# Patient Record
Sex: MALE | URBAN - METROPOLITAN AREA
[De-identification: names, ages, dates, MRNs, and addresses within clinical notes are randomized per-mention and may not be internally consistent; named-entity substitution may affect disease eponyms.]

---

## 2022-07-05 ENCOUNTER — OFFICE (OUTPATIENT)
Dept: URBAN - METROPOLITAN AREA CLINIC 30 | Facility: CLINIC | Age: 42
Setting detail: OPHTHALMOLOGY
End: 2022-07-05
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
## 2022-07-05 PROCEDURE — NO SHOW FE NO SHOW FEE: Performed by: OPHTHALMOLOGY

## 2023-08-21 ENCOUNTER — APPOINTMENT (OUTPATIENT)
Dept: PEDIATRIC ORTHOPEDIC SURGERY | Facility: CLINIC | Age: 43
End: 2023-08-21
Payer: MEDICAID

## 2023-08-21 VITALS — BODY MASS INDEX: 32.18 KG/M2 | HEIGHT: 67 IN | WEIGHT: 205 LBS

## 2023-08-21 VITALS — TEMPERATURE: 97 F | SYSTOLIC BLOOD PRESSURE: 131 MMHG | DIASTOLIC BLOOD PRESSURE: 84 MMHG

## 2023-08-21 DIAGNOSIS — S50.11XA CONTUSION OF RIGHT FOREARM, INITIAL ENCOUNTER: ICD-10-CM

## 2023-08-21 PROBLEM — Z00.00 ENCOUNTER FOR PREVENTIVE HEALTH EXAMINATION: Status: ACTIVE | Noted: 2023-08-21

## 2023-08-21 PROCEDURE — 99202 OFFICE O/P NEW SF 15 MIN: CPT

## 2023-08-21 RX ORDER — PREDNISONE 10 MG
TABLET ORAL
Refills: 0 | Status: ACTIVE | COMMUNITY

## 2023-08-21 RX ORDER — PREDNISONE 10 MG/1
10 TABLET ORAL AS DIRECTED
Qty: 20 | Refills: 0 | Status: ACTIVE | COMMUNITY
Start: 2023-08-21 | End: 1900-01-01

## 2023-08-21 NOTE — PHYSICAL EXAM
[de-identified] : Examination today reveals a full range of motion to the right elbow forearm and wrist he has no obvious swelling he has mild tenderness along the posterior aspect of the elbow no tenderness over the ulnar nerve with a negative Tinel's.  He does have significant discomfort over the extensor wad and lateral epicondyle with pain in this area on dorsiflexion of the wrist against resistance.  The elbow is stable to stress.  Neurovascular status is intact.

## 2023-08-21 NOTE — ASSESSMENT
[FreeTextEntry1] : Impression: Status post contusion right elbow with lateral epicondylitis.  Insofar as the prednisone is helping him significantly I have prescribed another 10 days of prednisone will return on appearing basis

## 2023-09-29 ENCOUNTER — APPOINTMENT (OUTPATIENT)
Dept: PEDIATRIC ORTHOPEDIC SURGERY | Facility: CLINIC | Age: 43
End: 2023-09-29
Payer: MEDICAID

## 2023-09-29 VITALS
BODY MASS INDEX: 32.18 KG/M2 | TEMPERATURE: 97.5 F | HEIGHT: 67 IN | DIASTOLIC BLOOD PRESSURE: 81 MMHG | WEIGHT: 205 LBS | SYSTOLIC BLOOD PRESSURE: 125 MMHG

## 2023-09-29 DIAGNOSIS — M77.11 LATERAL EPICONDYLITIS, RIGHT ELBOW: ICD-10-CM

## 2023-09-29 PROCEDURE — 20551 NJX 1 TENDON ORIGIN/INSJ: CPT | Mod: RT

## 2023-09-29 PROCEDURE — 99213 OFFICE O/P EST LOW 20 MIN: CPT | Mod: 25

## 2023-09-29 RX ORDER — DICLOFENAC SODIUM 75 MG/1
75 TABLET, DELAYED RELEASE ORAL TWICE DAILY
Qty: 60 | Refills: 1 | Status: ACTIVE | COMMUNITY
Start: 2023-09-29 | End: 1900-01-01

## 2023-10-21 NOTE — HISTORY OF PRESENT ILLNESS
[de-identified] : This 42-year-old right-handed healthy gentleman is seen for evaluation of his right elbow.  6 weeks ago he was carrying an object twisted to his right and he struck the back of his elbow against the corner of the [place] counter[place].  This precipitated pain and swelling.  He did not think it was anything significant however over time it continued to be painful.  He eventually was seen at an urgent care center x-rays were negative.  He was recently placed on a short course of prednisone by his internist and is has been helpful.  He has no numbness or paresthesias no clicking or popping.  Past history is negative
non-distended/non-tender